# Patient Record
Sex: FEMALE | Race: BLACK OR AFRICAN AMERICAN
[De-identification: names, ages, dates, MRNs, and addresses within clinical notes are randomized per-mention and may not be internally consistent; named-entity substitution may affect disease eponyms.]

---

## 2017-06-24 ENCOUNTER — HEALTH MAINTENANCE LETTER (OUTPATIENT)
Age: 57
End: 2017-06-24

## 2017-07-30 ENCOUNTER — APPOINTMENT (OUTPATIENT)
Dept: ULTRASOUND IMAGING | Facility: CLINIC | Age: 57
End: 2017-07-30
Attending: EMERGENCY MEDICINE
Payer: COMMERCIAL

## 2017-07-30 ENCOUNTER — HOSPITAL ENCOUNTER (EMERGENCY)
Facility: CLINIC | Age: 57
Discharge: HOME | End: 2017-07-30
Attending: EMERGENCY MEDICINE | Admitting: EMERGENCY MEDICINE
Payer: COMMERCIAL

## 2017-07-30 VITALS
OXYGEN SATURATION: 98 % | HEIGHT: 66 IN | TEMPERATURE: 98.1 F | WEIGHT: 190 LBS | BODY MASS INDEX: 30.53 KG/M2 | HEART RATE: 67 BPM | DIASTOLIC BLOOD PRESSURE: 71 MMHG | SYSTOLIC BLOOD PRESSURE: 146 MMHG | RESPIRATION RATE: 16 BRPM

## 2017-07-30 DIAGNOSIS — N93.9 VAGINAL BLEEDING: ICD-10-CM

## 2017-07-30 LAB
ABO + RH BLD: NORMAL
ABO + RH BLD: NORMAL
ANION GAP SERPL CALCULATED.3IONS-SCNC: 9 MMOL/L (ref 3–14)
APTT PPP: 26 SEC (ref 22–37)
BASOPHILS # BLD AUTO: 0 10E9/L (ref 0–0.2)
BASOPHILS NFR BLD AUTO: 0.8 %
BLD GP AB SCN SERPL QL: NORMAL
BLOOD BANK CMNT PATIENT-IMP: NORMAL
BUN SERPL-MCNC: 5 MG/DL (ref 7–30)
CALCIUM SERPL-MCNC: 9.3 MG/DL (ref 8.5–10.1)
CHLORIDE SERPL-SCNC: 107 MMOL/L (ref 94–109)
CO2 SERPL-SCNC: 24 MMOL/L (ref 20–32)
CREAT SERPL-MCNC: 0.52 MG/DL (ref 0.52–1.04)
DIFFERENTIAL METHOD BLD: NORMAL
EOSINOPHIL # BLD AUTO: 0.1 10E9/L (ref 0–0.7)
EOSINOPHIL NFR BLD AUTO: 2.8 %
ERYTHROCYTE [DISTWIDTH] IN BLOOD BY AUTOMATED COUNT: 13.7 % (ref 10–15)
GFR SERPL CREATININE-BSD FRML MDRD: ABNORMAL ML/MIN/1.7M2
GLUCOSE SERPL-MCNC: 97 MG/DL (ref 70–99)
HCG SERPL QL: NEGATIVE
HCT VFR BLD AUTO: 41.7 % (ref 35–47)
HGB BLD-MCNC: 14 G/DL (ref 11.7–15.7)
IMM GRANULOCYTES # BLD: 0 10E9/L (ref 0–0.4)
IMM GRANULOCYTES NFR BLD: 0 %
INR PPP: 0.95 (ref 0.86–1.14)
LYMPHOCYTES # BLD AUTO: 1.4 10E9/L (ref 0.8–5.3)
LYMPHOCYTES NFR BLD AUTO: 34.3 %
MCH RBC QN AUTO: 30.4 PG (ref 26.5–33)
MCHC RBC AUTO-ENTMCNC: 33.6 G/DL (ref 31.5–36.5)
MCV RBC AUTO: 91 FL (ref 78–100)
MONOCYTES # BLD AUTO: 0.5 10E9/L (ref 0–1.3)
MONOCYTES NFR BLD AUTO: 11.8 %
NEUTROPHILS # BLD AUTO: 2 10E9/L (ref 1.6–8.3)
NEUTROPHILS NFR BLD AUTO: 50.3 %
PLATELET # BLD AUTO: 188 10E9/L (ref 150–450)
POTASSIUM SERPL-SCNC: 4 MMOL/L (ref 3.4–5.3)
RBC # BLD AUTO: 4.61 10E12/L (ref 3.8–5.2)
SODIUM SERPL-SCNC: 140 MMOL/L (ref 133–144)
SPECIMEN EXP DATE BLD: NORMAL
WBC # BLD AUTO: 4 10E9/L (ref 4–11)

## 2017-07-30 PROCEDURE — 85025 COMPLETE CBC W/AUTO DIFF WBC: CPT | Performed by: EMERGENCY MEDICINE

## 2017-07-30 PROCEDURE — 96360 HYDRATION IV INFUSION INIT: CPT

## 2017-07-30 PROCEDURE — 76856 US EXAM PELVIC COMPLETE: CPT

## 2017-07-30 PROCEDURE — 86850 RBC ANTIBODY SCREEN: CPT | Performed by: EMERGENCY MEDICINE

## 2017-07-30 PROCEDURE — 86901 BLOOD TYPING SEROLOGIC RH(D): CPT | Performed by: EMERGENCY MEDICINE

## 2017-07-30 PROCEDURE — 84703 CHORIONIC GONADOTROPIN ASSAY: CPT | Performed by: EMERGENCY MEDICINE

## 2017-07-30 PROCEDURE — 86900 BLOOD TYPING SEROLOGIC ABO: CPT | Performed by: EMERGENCY MEDICINE

## 2017-07-30 PROCEDURE — 96361 HYDRATE IV INFUSION ADD-ON: CPT

## 2017-07-30 PROCEDURE — 99284 EMERGENCY DEPT VISIT MOD MDM: CPT | Mod: 25

## 2017-07-30 PROCEDURE — 85610 PROTHROMBIN TIME: CPT | Performed by: EMERGENCY MEDICINE

## 2017-07-30 PROCEDURE — 25000128 H RX IP 250 OP 636: Performed by: EMERGENCY MEDICINE

## 2017-07-30 PROCEDURE — 80048 BASIC METABOLIC PNL TOTAL CA: CPT | Performed by: EMERGENCY MEDICINE

## 2017-07-30 PROCEDURE — 85730 THROMBOPLASTIN TIME PARTIAL: CPT | Performed by: EMERGENCY MEDICINE

## 2017-07-30 RX ORDER — SODIUM CHLORIDE 9 MG/ML
INJECTION, SOLUTION INTRAVENOUS CONTINUOUS
Status: DISCONTINUED | OUTPATIENT
Start: 2017-07-30 | End: 2017-07-30 | Stop reason: HOSPADM

## 2017-07-30 RX ORDER — MEDROXYPROGESTERONE ACETATE 10 MG
10 TABLET ORAL 2 TIMES DAILY PRN
Qty: 10 TABLET | Refills: 0 | Status: SHIPPED | OUTPATIENT
Start: 2017-07-30 | End: 2017-08-04

## 2017-07-30 RX ADMIN — SODIUM CHLORIDE 1000 ML: 9 INJECTION, SOLUTION INTRAVENOUS at 08:56

## 2017-07-30 ASSESSMENT — ENCOUNTER SYMPTOMS
WEAKNESS: 0
LIGHT-HEADEDNESS: 0
ABDOMINAL PAIN: 0

## 2017-07-30 NOTE — ED AVS SNAPSHOT
Emergency Department    [address] [place] [address]    Phone:  [phone]    Fax:  [phone]                                       Sabine Valencia   MRN: [id number]    Department:   Emergency Department   Date of Visit:  7/30/2017           Patient Information     Date Of Birth          1960        Your diagnoses for this visit were:     Vaginal bleeding        You were seen by Margaret Barlow MD.      Follow-up Information     Please follow up.    Why:  See the gyn tomorrow.         Discharge Instructions       Take the provera 10 twice a day if you are bleeding heavily. Further instructions per your GYN.     Dysfunctional Uterine Bleeding    Dysfunctional uterine bleeding is a condition in which bleeding is abnormal and occurs at unexpected times of the month. This happens because of changes in the hormones that help control a woman s menstrual cycle each month.  The bleeding may be heavier or lighter than normal. If you have heavy bleeding often, this can lead to a problem called anemia. With anemia, your red blood cell count is too low. Red blood cells are needed because they help carry oxygen throughout your body. Severe anemia may cause you to look pale and feel very weak or tired. You might also become short of breath easily.  To treat dysfunctional uterine bleeding, medicines are often tried first. If these don t help, further testing and treatments may be needed. Discuss all of your options with your provider.  Home care  Medicines  If you re prescribed medicines, be sure to take them as directed. Some of the more common medicines you may be prescribed include:    Hormone therapy (Options include most methods of hormonal birth control such as pills, shots, or a hormone-releasing IUD)    Nonsteroidal anti-inflammatory drugs (NSAIDs), such as ibuprofen    Iron supplements, if you have anemia     General care    Get plenty of rest if you tire easily. Avoid heavy  exertion.    To help relieve pain or cramping that may occur with bleeding, try using a heating pad on the lower belly or back. A warm bath may also help.  Follow-up care  Follow up with your healthcare provider as directed.  When to seek medical advice  Call your healthcare provider right away if:    Bleeding becomes heavy (soaking 1 pad or tampon every hour for 3 hours)    Increased abdominal pain    Irregular bleeding worsens or does not get better even with treatment    Fever of 100.4 F (38 C) or higher, or as directed by your provider    Signs of anemia, such as pale skin, extreme fatigue or weakness, or shortness of breath    Dizziness or fainting   Date Last Reviewed: 6/11/2015 2000-2017 [employer]. [address] [place] [address], [place] [id number]. All rights reserved. This information is not intended as a substitute for professional medical care. Always follow your healthcare professional's instructions.          24 Hour Appointment Hotline       To make an appointment at any [facility], call [phone] ([phone]). If you don't have a family doctor or clinic, we will help you find one. [facility] clinics are conveniently located to serve the needs of you and your family.             Review of your medicines      START taking        Dose / Directions Last dose taken    medroxyPROGESTERone 10 MG tablet   Commonly known as:  PROVERA   Dose:  10 mg   Quantity:  10 tablet        Take 1 tablet (10 mg) by mouth 2 times daily as needed   Refills:  0          Our records show that you are taking the medicines listed below. If these are incorrect, please call your family doctor or clinic.        Dose / Directions Last dose taken    IBUPROFEN PO        Refills:  0                Prescriptions were sent or printed at these locations (1 Prescription)                   Other Prescriptions                Printed at Department/Unit printer (1 of 1)         medroxyPROGESTERone (PROVERA) 10 MG tablet                 Procedures and tests performed during your visit     ABO/Rh type and screen    Basic metabolic panel    CBC with platelets differential    HCG qualitative    INR    Partial thromboplastin time    US Pelvis Complete without Transvaginal      Orders Needing Specimen Collection     None      Pending Results     No orders found from 7/28/2017 to 7/31/2017.            Pending Culture Results     No orders found from 7/28/2017 to 7/31/2017.            Pending Results Instructions     If you had any lab results that were not finalized at the time of your Discharge, you can call the ED Lab Result RN at [phone]. You will be contacted by this team for any positive Lab results or changes in treatment. The nurses are available 7 days a week from 10A to 6:30P.  You can leave a message 24 hours per day and they will return your call.        Test Results From Your Hospital Stay        7/30/2017  9:11 AM      Component Results     Component Value Ref Range & Units Status    WBC 4.0 4.0 - 11.0 10e9/L Final    RBC Count 4.61 3.8 - 5.2 10e12/L Final    Hemoglobin 14.0 11.7 - 15.7 g/dL Final    Hematocrit 41.7 35.0 - 47.0 % Final    MCV 91 78 - 100 fl Final    MCH 30.4 26.5 - 33.0 pg Final    MCHC 33.6 31.5 - 36.5 g/dL Final    RDW 13.7 10.0 - 15.0 % Final    Platelet Count 188 150 - 450 10e9/L Final    Diff Method Automated Method  Final    % Neutrophils 50.3 % Final    % Lymphocytes 34.3 % Final    % Monocytes 11.8 % Final    % Eosinophils 2.8 % Final    % Basophils 0.8 % Final    % Immature Granulocytes 0.0 % Final    Absolute Neutrophil 2.0 1.6 - 8.3 10e9/L Final    Absolute Lymphocytes 1.4 0.8 - 5.3 10e9/L Final    Absolute Monocytes 0.5 0.0 - 1.3 10e9/L Final    Absolute Eosinophils 0.1 0.0 - 0.7 10e9/L Final    Absolute Basophils 0.0 0.0 - 0.2 10e9/L Final    Abs Immature Granulocytes 0.0 0 - 0.4 10e9/L Final         7/30/2017  9:47 AM      Component Results     Component    ABO    O    RH(D)     Pos     Antibody Screen    Neg    Test Valid Only At    [facility]    Specimen Expires    08/02/2017 7/30/2017  9:25 AM      Component Results     Component Value Ref Range & Units Status    INR 0.95 0.86 - 1.14 Final         7/30/2017  9:25 AM      Component Results     Component Value Ref Range & Units Status    PTT 26 22 - 37 sec Final         7/30/2017  9:31 AM      Component Results     Component Value Ref Range & Units Status    Sodium 140 133 - 144 mmol/L Final    Potassium 4.0 3.4 - 5.3 mmol/L Final    Chloride 107 94 - 109 mmol/L Final    Carbon Dioxide 24 20 - 32 mmol/L Final    Anion Gap 9 3 - 14 mmol/L Final    Glucose 97 70 - 99 mg/dL Final    Urea Nitrogen 5 (L) 7 - 30 mg/dL Final    Creatinine 0.52 0.52 - 1.04 mg/dL Final    GFR Estimate >90  Non  GFR Calc   >60 mL/min/1.7m2 Final    GFR Estimate If Black >90   GFR Calc   >60 mL/min/1.7m2 Final    Calcium 9.3 8.5 - 10.1 mg/dL Final               7/30/2017 10:20 AM      Narrative     COMPLETE PELVIS ULTRASOUND WITHOUT TRANSVAGINAL  7/30/2017 9:48 AM     HISTORY: Pelvic pain.    TECHNIQUE: Pelvic ultrasound. Transabdominal sonography was performed.    FINDINGS: There is a large myomatous uterus measuring 16.4 x 11.7 x  10.0 cm. Numerous fibroids are present and there is marked  inhomogeneity of the uterus. The largest fibroids measured are 6.4 x  6.2 x 5.7 cm, 4.3 x 4.2 x 4.2 cm, and 4.1 x 4.0 x 4.1 cm. Several are  abutting or encroaching upon the endometrium. The right ovary measures  3.4 x 2.5 x 2.4 cm and contains a simple cyst or collapsing follicle  measuring 2.5 x 1.4 x 1.9 cm. The left ovary measures 3.5 x 2.8 x 2.3  cm and is normal. No free fluid is present. No adnexal masses are  present.        Impression     IMPRESSION:  1. Enlarged myomatous uterus with multiple ill-defined fibroids  abutting or encroaching on the endometrium. The endometrial stripe  appears normal.  2. Collapsing cyst or  follicle in the right ovary measuring 2.5 x 1.4  x 1.9 cm.    RIVERA DICKERSON MD         7/30/2017 10:47 AM      Component Results     Component Value Ref Range & Units Status    HCG Qualitative Serum Negative NEG Final                Clinical Quality Measure: Blood Pressure Screening     Your blood pressure was checked while you were in the emergency department today. The last reading we obtained was  BP: 171/82 . Please read the guidelines below about what these numbers mean and what you should do about them.  If your systolic blood pressure (the top number) is less than 120 and your diastolic blood pressure (the bottom number) is less than 80, then your blood pressure is normal. There is nothing more that you need to do about it.  If your systolic blood pressure (the top number) is 120-139 or your diastolic blood pressure (the bottom number) is 80-89, your blood pressure may be higher than it should be. You should have your blood pressure rechecked within a year by a primary care provider.  If your systolic blood pressure (the top number) is 140 or greater or your diastolic blood pressure (the bottom number) is 90 or greater, you may have high blood pressure. High blood pressure is treatable, but if left untreated over time it can put you at risk for heart attack, stroke, or kidney failure. You should have your blood pressure rechecked by a primary care provider within the next 4 weeks.  If your provider in the emergency department today gave you specific instructions to follow-up with your doctor or provider even sooner than that, you should follow that instruction and not wait for up to 4 weeks for your follow-up visit.        Thank you for choosing [facility]       Thank you for choosing [facility] for your care. Our goal is always to provide you with excellent care. Hearing back from our patients is one way we can continue to improve our services. Please take a few minutes to complete the written survey that you may  receive in the mail after you visit with us. Thank you!        [email or web address] Information     [facility] gives you secure access to your electronic health record. If you see a primary care provider, you can also send messages to your care team and make appointments. If you have questions, please call your primary care clinic.  If you do not have a primary care provider, please call [phone] and they will assist you.        Care EveryWhere ID     This is your Care EveryWhere ID. This could be used by other organizations to access your [facility] medical records  [id number]        Equal Access to Services     [facility] : Matt Mahoney, bucky ruiz, hernan rod, el augustin . So [facility] [phone].    ATENCIÓN: Si habla español, tiene a machuca disposición servicios gratuitos de asistencia lingüística. Llame al [phone].    We comply with applicable federal civil rights laws and [place] laws. We do not discriminate on the basis of race, color, national origin, age, disability sex, sexual orientation or gender identity.            After Visit Summary       This is your record. Keep this with you and show to your community pharmacist(s) and doctor(s) at your next visit.

## 2017-07-30 NOTE — ED AVS SNAPSHOT
Emergency Department    [address] [place] [address]    Phone:  [phone]    Fax:  [phone]                                       Sabine Valencia   MRN: [id number]    Department:   Emergency Department   Date of Visit:  7/30/2017           After Visit Summary Signature Page     I have received my discharge instructions, and my questions have been answered. I have discussed any challenges I see with this plan with the nurse or doctor.    ..........................................................................................................................................  Patient/Patient Representative Signature      ..........................................................................................................................................  Patient Representative Print Name and Relationship to Patient    ..................................................               ................................................  Date                                            Time    ..........................................................................................................................................  Reviewed by Signature/Title    ...................................................              ..............................................  Date                                                            Time

## 2017-07-30 NOTE — DISCHARGE INSTRUCTIONS
Take the provera 10 twice a day if you are bleeding heavily. Further instructions per your GYN.     Dysfunctional Uterine Bleeding    Dysfunctional uterine bleeding is a condition in which bleeding is abnormal and occurs at unexpected times of the month. This happens because of changes in the hormones that help control a woman s menstrual cycle each month.  The bleeding may be heavier or lighter than normal. If you have heavy bleeding often, this can lead to a problem called anemia. With anemia, your red blood cell count is too low. Red blood cells are needed because they help carry oxygen throughout your body. Severe anemia may cause you to look pale and feel very weak or tired. You might also become short of breath easily.  To treat dysfunctional uterine bleeding, medicines are often tried first. If these don t help, further testing and treatments may be needed. Discuss all of your options with your provider.  Home care  Medicines  If you re prescribed medicines, be sure to take them as directed. Some of the more common medicines you may be prescribed include:    Hormone therapy (Options include most methods of hormonal birth control such as pills, shots, or a hormone-releasing IUD)    Nonsteroidal anti-inflammatory drugs (NSAIDs), such as ibuprofen    Iron supplements, if you have anemia     General care    Get plenty of rest if you tire easily. Avoid heavy exertion.    To help relieve pain or cramping that may occur with bleeding, try using a heating pad on the lower belly or back. A warm bath may also help.  Follow-up care  Follow up with your healthcare provider as directed.  When to seek medical advice  Call your healthcare provider right away if:    Bleeding becomes heavy (soaking 1 pad or tampon every hour for 3 hours)    Increased abdominal pain    Irregular bleeding worsens or does not get better even with treatment    Fever of 100.4 F (38 C) or higher, or as directed by your provider    Signs of anemia,  such as pale skin, extreme fatigue or weakness, or shortness of breath    Dizziness or fainting   Date Last Reviewed: 6/11/2015 2000-2017 [employer]. [address] [place] [address], [place] [address]. All rights reserved. This information is not intended as a substitute for professional medical care. Always follow your healthcare professional's instructions.

## 2017-07-30 NOTE — ED PROVIDER NOTES
History     Chief Complaint:  Vaginal Bleeding     HPI   Sabine Valencia is a 57 year old female with a history of uterine fibroids and menorrhagia  who presents to the emergency department today for evaluation of vaginal bleeding that started about 3 days ago. An ultrasound was performed and read back in 2014;  study is below. The patient has a history of heavy bleeding, and received a 3.75 mg IM injection of Depot Lupron at [facility] back in October of 2016. She then had an IUD placed which slowed the frequency of her periods, but she still reports having periods every month. Her periods usually last 4-5 days, but last month's lasted about 7. She notes that in between her periods there is some light bleeding seen. Currently, the patient has had to wear a pad and an adult diaper for her heavy bleeding over the last couple days.  The pad is replaced about once every hour. The patient has been having light headedness, but no syncope reported. She is not in any pain at the moment, denies abdominal pain, weakness, light headedness, or any other symptoms to report at this time.     Pelvic Ultrasound at [facility] 09/04/2014  Today's ultrasound was compared to previous report from: 6/12/14    Measurements and comments  Uterus:  Longitudinal AP Transverse   15.5 10.0 11.7 cm   Position:  anteverted  Comments:  Irregular, myomatous    Cervix and lower uterine segment are: Normal  Myometrium: heterogeneous    Myomata:  Multiple fibroids appear in clusters, recently measured in June 2014.     Saline infusion sonohysterogram: no  Endometrium: 4.5 mm, smooth and regular    Right ovary:   Longitudinal AP Transverse   3.3 2.7 3.0 cm   Comments: appears normal    Left ovary:  Longitudinal AP Transverse   2.2 1.5 1.6 cm   Comments: appears normal    Adnexa:  no masses seen    Peritoneal fluid: absent    Other procedures done: none    Impression:   Uterus w multiple myomas  Both ovaries normal,       "Previous cyst has resolved   No free fluid  Smooth regualr endometrial  Stripe, 4.5 mm     Plan: These findings were reviewed with the patient. She will follow up   with her referring provider.     Ned Oseguera MD    Allergies:  Aspirin  Ampicillin  Penicillins      Medications:    Ibuprofen      Past Medical History:    Cervicalgia  Uterine fibroids  Hyperlipidemia    Past Surgical History:    The patient does not have any pertinent past surgical history.     Family History:    No past pertinent family history.     Social History:  The patient was accompanied to the ED alone.  OBGYN - Dr. Osmar SAMPSON in [place].  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  Smoking Status: never  Alcohol Use: no  Marital Status:  Single [1]     Review of Systems   Gastrointestinal: Negative for abdominal pain.   Genitourinary: Positive for vaginal bleeding. Negative for vaginal pain.   Neurological: Negative for syncope, weakness and light-headedness.   All other systems reviewed and are negative.    Physical Exam     Patient Vitals for the past 24 hrs:   BP Temp Temp src Pulse Heart Rate Resp SpO2 Height Weight   07/30/17 1130 146/71 - - 67 - 16 98 % - -   07/30/17 1100 171/82 - - - - - 99 % - -   07/30/17 1030 (!) 163/97 - - - - - 97 % - -   07/30/17 1013 161/81 - - 71 - 16 98 % - -   07/30/17 0900 155/80 - - - - - 95 % - -   07/30/17 0829 (!) 196/93 98.1  F (36.7  C) Oral - 78 16 99 % 1.676 m (5' 6\") 86.2 kg (190 lb)     Physical Exam  Constitutional:  Oriented to person, place, and time.      Appears well-developed and well-nourished.   HENT:   Head:    Normocephalic and atraumatic.   Right Ear:   Tympanic membrane and external ear normal.   Left Ear:   Tympanic membrane and external ear normal.   Mouth/Throat:   Oropharynx is clear and moist.      Mucous membranes are normal.   Eyes:    Conjunctivae normal and EOM are normal.      Pupils are equal, " round, and reactive to light.   Neck:    Normal range of motion. Neck supple.   Cardiovascular:  Normal rate, regular rhythm, S1 normal and S2 normal.      No gallop and no friction rub. No murmur heard.  Pulmonary/Chest:  Breath sounds normal. No respiratory distress.      No wheezes. No rhonchi. No rales.   Abdominal:   Soft. No hepatosplenomegaly.      No rebound and no CVA tenderness.     Mild suprapubic tenderness.   :   Dark pooling of blood, uterus is large to 18 week size,  cervix is closed.  Musculoskeletal:  Normal range of motion.   Neurological:   Alert and oriented to person, place, and time. Normal strength.      GCS eye subscore is 4. GCS verbal subscore is 5.      GCS motor subscore is 6.   Skin:    Skin is warm and dry.   Psychiatric:   Normal mood and affect.      Speech is normal and behavior is normal.      Judgment and thought content normal.      Cognition and memory are normal.     Emergency Department Course     Imaging:  Radiology findings were communicated with the patient who voiced understanding of the findings.  Pelvic Ultrasound without Transvaginal  1. Enlarged myomatous uterus with multiple ill-defined fibroids  abutting or encroaching on the endometrium. The endometrial stripe  appears normal.  2. Collapsing cyst or follicle in the right ovary measuring 2.5 x 1.4  x 1.9 cm.  Reading per radiology     Laboratory:  Laboratory findings were communicated with the patient who voiced understanding of the findings.  CBC: AWNL (WBC 4.0, HGB 14.0, )  ABO: O+, antibody negative   INR: 0.95  PTT: 26  BMP: BUN 5 (L) o/w WNL (Creatinine 0.52)    Interventions:  0856 normal saline 1 L IV     Emergency Department Course:  Nursing notes and vitals reviewed.  0920 I performed an exam of the patient as documented above.   0950 I performed a pelvic examination as noted above with the physical exam.  IV was inserted and blood was drawn for laboratory testing, results above.  The patient was  sent for an ultrasound while in the emergency department, results above.    At 1115 the patient was rechecked and was updated on the results of her laboratory and imaging studies.   I discussed the treatment plan with the patient. They expressed understanding of this plan and consented to discharge. They will be discharged home with instructions for care and follow up. In addition, the patient will return to the emergency department if their symptoms persist, worsen, if new symptoms arise or if there is any concern.  All questions were answered.    Impression & Plan      Medical Decision Making:  Sabine Valencia is a 57 year old female with a long history of vaginal bleeding thought to be due to hormonal issues and fibroids, who presents to the emergency department today for evaluation of vaginal bleeding. She was treated recently with hormonal injection with Depot and most recently an IUD placement in 2016, but has persisted to have heavy bleeding. Says she has an appointment with her  doctor tomorrow. She notes that for the past few days she has been bleeding more heavily and that when she gets up, she begins to notice even more blood. She has a large uterus, 18 weeks size, but stable vital signs, and her hemoglobin is good. Ultrasound shows fibroids with normal  endometrial stripe. I have talked to her OB doctor and they  recommended starting Provera and they will see her in the clinic tomorrow. The patient was advised of what to watch for in terms of worsening bleeding.    Diagnosis:    ICD-10-CM   1. Vaginal bleeding N93.9   2. Multiple large fibroids.   Disposition:   The patient was discharged to home with the below medications to be taken at home as instructed.    Discharge Medications:  Discharge Medication List as of 7/30/2017 11:18 AM      START taking these medications    Details   medroxyPROGESTERone (PROVERA) 10 MG tablet Take 1 tablet (10 mg) by mouth 2 times daily as needed, Disp-10 tablet, R-0,  Local Print        Scribe Disclosure:  I, Karlos Kilgore, am serving as a scribe at 8:45 AM on 7/30/2017 to document services personally performed by Margaret Barlow MD, based on my observations and the provider's statements to me. 7/30/2017    EMERGENCY DEPARTMENT       Margaret Balrow MD  07/30/17 [id number]

## 2018-03-03 ENCOUNTER — OFFICE VISIT (OUTPATIENT)
Dept: URGENT CARE | Facility: URGENT CARE | Age: 58
End: 2018-03-03

## 2018-03-03 VITALS
OXYGEN SATURATION: 97 % | WEIGHT: 190.06 LBS | BODY MASS INDEX: 30.68 KG/M2 | DIASTOLIC BLOOD PRESSURE: 78 MMHG | SYSTOLIC BLOOD PRESSURE: 149 MMHG | HEART RATE: 89 BPM | TEMPERATURE: 101.1 F

## 2018-03-03 DIAGNOSIS — J11.1 FLU: Primary | ICD-10-CM

## 2018-03-03 DIAGNOSIS — R05.9 COUGH: ICD-10-CM

## 2018-03-03 PROCEDURE — 99213 OFFICE O/P EST LOW 20 MIN: CPT | Performed by: FAMILY MEDICINE

## 2018-03-03 RX ORDER — CODEINE PHOSPHATE AND GUAIFENESIN 10; 100 MG/5ML; MG/5ML
1 SOLUTION ORAL EVERY 4 HOURS PRN
Qty: 120 ML | Refills: 0 | Status: SHIPPED | OUTPATIENT
Start: 2018-03-03

## 2018-03-03 RX ORDER — OSELTAMIVIR PHOSPHATE 75 MG/1
75 CAPSULE ORAL 2 TIMES DAILY
Qty: 10 CAPSULE | Refills: 0 | Status: SHIPPED | OUTPATIENT
Start: 2018-03-03 | End: 2018-03-08

## 2018-03-03 NOTE — MR AVS SNAPSHOT
After Visit Summary   3/3/2018    Sabine Valencia    MRN: [id number]           Patient Information     Date Of Birth          1960        Visit Information        Provider Department      3/3/2018 9:30 AM Juli Blake MD [facility]        Today's Diagnoses     Flu    -  1    Cough           Follow-ups after your visit        Who to contact     If you have questions or need follow up information about today's clinic visit or your schedule please contact [facility] directly at [phone].  Normal or non-critical lab and imaging results will be communicated to you by [employer], letter or phone within 4 business days after the clinic has received the results. If you do not hear from us within 7 days, please contact the clinic through [employer] or phone. If you have a critical or abnormal lab result, we will notify you by phone as soon as possible.  Submit refill requests through [employer] or call your pharmacy and they will forward the refill request to us. Please allow 3 business days for your refill to be completed.          Additional Information About Your Visit        MyChart Information     [employer] gives you secure access to your electronic health record. If you see a primary care provider, you can also send messages to your care team and make appointments. If you have questions, please call your primary care clinic.  If you do not have a primary care provider, please call [phone] and they will assist you.        Care EveryWhere ID     This is your Care EveryWhere ID. This could be used by other organizations to access your [facility] medical records  [id number]        Your Vitals Were     Pulse Temperature Pulse Oximetry BMI (Body Mass Index)          89 101.1  F (38.4  C) (Oral) 97% 30.68 kg/m2         Blood Pressure from Last 3 Encounters:   03/03/18 149/78   07/30/17 146/71   12/18/16 173/86    Weight from Last 3 Encounters:    03/03/18 190 lb 1 oz (86.2 kg)   07/30/17 190 lb (86.2 kg)   12/18/16 170 lb (77.1 kg)              Today, you had the following     No orders found for display         Today's Medication Changes          These changes are accurate as of 3/3/18 10:00 AM.  If you have any questions, ask your nurse or doctor.               Start taking these medicines.        Dose/Directions    guaiFENesin-codeine 100-10 MG/5ML Soln solution   Commonly known as:  ROBITUSSIN AC   Used for:  Cough   Started by:  uJli Blake MD        Dose:  1 tsp.   Take 5 mLs by mouth every 4 hours as needed for cough   Quantity:  120 mL   Refills:  0       oseltamivir 75 MG capsule   Commonly known as:  TAMIFLU   Used for:  Flu   Started by:  Juli Blake MD        Dose:  75 mg   Take 1 capsule (75 mg) by mouth 2 times daily for 5 days   Quantity:  10 capsule   Refills:  0            Where to get your medicines      These medications were sent to [facility] [address] [place] [address] [address] [place] [facility]  [address], [facility] [address]    Hours:  24-hours Phone:  [phone]     oseltamivir 75 MG capsule         Some of these will need a paper prescription and others can be bought over the counter.  Ask your nurse if you have questions.     Bring a paper prescription for each of these medications     guaiFENesin-codeine 100-10 MG/5ML Soln solution                Primary Care Provider Office Phone # Fax #    [facility] [phone] [address]  [facility] [address]        Equal Access to Services     ODILIA ROWAN AH: Hadii betsey carlson Sodawson, waaxda luqadaha, qaybta kaalmada viola, el back. So [facility] [phone].    ATENCIÓN: Si habla español, tiene a machuca disposición servicios gratuitos de asistencia lingüística. Llame al [phone].    We comply with applicable federal civil rights laws and [place] laws.  We do not discriminate on the basis of race, color, national origin, age, disability, sex, sexual orientation, or gender identity.            Thank you!     Thank you for choosing [facility]  for your care. Our goal is always to provide you with excellent care. Hearing back from our patients is one way we can continue to improve our services. Please take a few minutes to complete the written survey that you may receive in the mail after your visit with us. Thank you!             Your Updated Medication List - Protect others around you: Learn how to safely use, store and throw away your medicines at www.disposemymeds.org.          This list is accurate as of 3/3/18 10:00 AM.  Always use your most recent med list.                   Brand Name Dispense Instructions for use Diagnosis    guaiFENesin-codeine 100-10 MG/5ML Soln solution    ROBITUSSIN AC    120 mL    Take 5 mLs by mouth every 4 hours as needed for cough    Cough       IBUPROFEN PO           oseltamivir 75 MG capsule    TAMIFLU    10 capsule    Take 1 capsule (75 mg) by mouth 2 times daily for 5 days    Flu       TYLENOL COLD/FLU SEVERE PO      Take by mouth as needed

## 2018-03-03 NOTE — PROGRESS NOTES
SUBJECTIVE:  Sabine Valencia is a 57 year old female who presents to the clinic today with a chief complaint of cough  and chills and fever  for 4 day(s).but for 1 day her symptoms are getting worse   Her cough is described as persistent, daytime, nightime and productive of yellow sputum.    The patient's symptoms are moderate and not changing over the course of time.  Associated symptoms include congestion, nasal congestion and fever. The patient's symptoms are exacerbated by no particular triggers  Patient has been using Tylenol  to improve symptoms.    Past Medical History:   Diagnosis Date     Hyperlipidemia LDL goal <160 10/23/2012       Current Outpatient Prescriptions   Medication Sig Dispense Refill     Phenylephrine-DM-GG-APAP (TYLENOL COLD/FLU SEVERE PO) Take by mouth as needed       guaiFENesin-codeine (ROBITUSSIN AC) 100-10 MG/5ML SOLN solution Take 5 mLs by mouth every 4 hours as needed for cough 120 mL 0     oseltamivir (TAMIFLU) 75 MG capsule Take 1 capsule (75 mg) by mouth 2 times daily for 5 days 10 capsule 0     IBUPROFEN PO          Social History   Substance Use Topics     Smoking status: Never Smoker     Smokeless tobacco: Never Used     Alcohol use Not on file       ROS  Review of systems negative except as stated above.    OBJECTIVE:  /78  Pulse 89  Temp 101.1  F (38.4  C) (Oral)  Wt 190 lb 1 oz (86.2 kg)  SpO2 97%  BMI 30.68 kg/m2  GENERAL APPEARANCE: healthy, alert and no distress  EYES: EOMI,  PERRL, conjunctiva clear  HENT: ear canals and TM's normal.  Nose and mouth without ulcers, erythema or lesions  NECK: supple, nontender, no lymphadenopathy  RESP: lungs clear to auscultation - no rales, rhonchi or wheezes  CV: regular rates and rhythm, normal S1 S2, no murmur noted  ABDOMEN:  soft, nontender, no HSM or masses and bowel sounds normal  SKIN: no suspicious lesions or rashes    ASSESSMENT:   Sabine was seen today for flu symptoms.    Diagnoses and all orders for this  visit:    Flu  -     oseltamivir (TAMIFLU) 75 MG capsule; Take 1 capsule (75 mg) by mouth 2 times daily for 5 days    Cough  -     guaiFENesin-codeine (ROBITUSSIN AC) 100-10 MG/5ML SOLN solution; Take 5 mLs by mouth every 4 hours as needed for cough        PLAN:  See orders in Epic  Symptomatic measures encouraged, humidified air, plenty of fluids.  Discussed with pt if notices any worsening symptoms of chest pain or sob or chest heaviness   Or increasing fever should follow up   Follow up if  symptoms fail to improve or worsens   Pt understood and agreed with plan

## 2019-12-16 ENCOUNTER — HEALTH MAINTENANCE LETTER (OUTPATIENT)
Age: 59
End: 2019-12-16

## 2021-01-15 ENCOUNTER — HEALTH MAINTENANCE LETTER (OUTPATIENT)
Age: 61
End: 2021-01-15

## 2021-09-04 ENCOUNTER — HEALTH MAINTENANCE LETTER (OUTPATIENT)
Age: 61
End: 2021-09-04

## 2021-12-25 ENCOUNTER — HEALTH MAINTENANCE LETTER (OUTPATIENT)
Age: 61
End: 2021-12-25

## 2022-02-19 ENCOUNTER — HEALTH MAINTENANCE LETTER (OUTPATIENT)
Age: 62
End: 2022-02-19

## 2022-10-22 ENCOUNTER — HEALTH MAINTENANCE LETTER (OUTPATIENT)
Age: 62
End: 2022-10-22

## 2023-04-01 ENCOUNTER — HEALTH MAINTENANCE LETTER (OUTPATIENT)
Age: 63
End: 2023-04-01

## 2024-01-14 ENCOUNTER — HEALTH MAINTENANCE LETTER (OUTPATIENT)
Age: 64
End: 2024-01-14